# Patient Record
(demographics unavailable — no encounter records)

---

## 2025-05-19 NOTE — ASSESSMENT
[FreeTextEntry1] : will start singulair and prn albuterol  check labs, ddimer, cbc, bmp  if ddimer elevated will order cta, if negative will order noncontrast chest ct, body habitus makes it difficult to fully evaluate lungs on cxr.    A total of 45 minutes was spent on this encounter including history taking, chart review, physical examination, testing interpretation and treatment plan.

## 2025-05-19 NOTE — PROCEDURE
[FreeTextEntry1] : PFT: Normal spirometry.  Lung volumes normal. DLCO mild reduction.  CXR: clear lungs, no focal consolidation or pleural effusions, cardiac silhouette appears normal.  No bony abnormality.

## 2025-05-19 NOTE — HISTORY OF PRESENT ILLNESS
[Never] : never [TextBox_4] : 58F   reports pneumonia in march, since then more sob and wheezing than normal.  No hx of asthma, suffers from allergies.  Sedentary job, at her desk for many hours a day, works from home, this hasn't changed.  No recent travel.  No cough/hemoptysis.

## 2025-05-30 NOTE — PROCEDURE
[FreeTextEntry1] : Reviewed:   	 EXAM: [id number] - US DPLX LWR EXT VEINS COMPL BI  - ORDERED BY: MAKAYLA SYLVESTER   PROCEDURE DATE:  05/27/2025    INTERPRETATION:  CLINICAL INFORMATION: Evaluate for DVT.  COMPARISON: None available.  TECHNIQUE: Duplex sonography of the BILATERAL LOWER extremity veins with color and spectral Doppler, with and without compression.  FINDINGS:  RIGHT: Normal compressibility of the RIGHT common femoral, femoral and popliteal veins. Doppler examination shows normal spontaneous and phasic flow. No RIGHT calf vein thrombosis is detected.  LEFT: Normal compressibility of the LEFT common femoral, femoral and popliteal veins. Doppler examination shows normal spontaneous and phasic flow. No LEFT calf vein thrombosis is detected.  IMPRESSION: No evidence of deep venous thrombosis in either lower extremity.  --- End of Report ---       IVETTE JAMES This document has been electronically signed. May 27 2025  5:05PM   	 EXAM: [id number] - CT ANGIO CHEST PULM ART [facility]   - ORDERED BY: MAKAYLA SYLVESTER   PROCEDURE DATE:  05/20/2025    INTERPRETATION:  INDICATION: Shortness of breath, elevated d-dimer, evaluate for pulmonary embolism.  CT angiogram of the chest was performed following intravenous administration of 70 cc of Omnipaque-350. 20 cc of contrast was discarded. MIP images are submitted.  COMPARISON: No prior chest CTs.  The PE study is limited for evaluation of some of the subsegmental pulmonary arterial branches due to respiratory motion artifact. No pulmonary arterial emboli within the other well visualized pulmonary arteries.  Heterogeneous enlargement of the thyroid gland with intrathoracic extension of the left lobe. For reference the left lobe of the thyroid gland measures about 3.8 cm in transverse dimension.  LYMPH NODES/MEDIASTINUM: No lymphadenopathy.  HEART: No pericardial effusion. Mild mitral annular calcification. Left atrium measures enlarged measuring about 5.1 cm in anteroposterior dimension. Main pulmonary artery measures enlarged measuring about 3.9 cm in transverse dimension.  UPPER ABDOMEN: Small hiatal hernia.  LUNGS/PLEURA: Respiratory motion artifact slightly limits evaluation of the lung parenchyma. No pneumonia. Minimal bilateral lower lung linear or subsegmental atelectasis. No traction bronchiectasis or honeycombing.  CHEST WALL: Spinal Degenerative Changes.  IMPRESSION: The PE study is limited for evaluation of some of the subsegmental pulmonary arterial branches due to respiratory motion artifact. No pulmonary arterial emboli within the other well visualized pulmonary arteries.  Enlargement of the thyroid gland.  --- End of Report ---       DANIEL CARVAJAL MD; Attending Radiologist This document has been electronically signed. May 20 2025  3:18PM  PFT: Normal spirometry.  Lung volumes normal. DLCO mild reduction.  CXR: clear lungs, no focal consolidation or pleural effusions, cardiac silhouette appears normal.  No bony abnormality.

## 2025-05-30 NOTE — HISTORY OF PRESENT ILLNESS
[TextBox_4] : feels better with singulair less wheezing used albuterol once  still has dyspnea morbid obesity snoring never tested for kiki

## 2025-05-30 NOTE — ASSESSMENT
[FreeTextEntry1] : cont singulair  discussed weight as a likely contributor to her dyspnea high suspicion for kiki will get home sleep test done briefly discussed the use of zepbound to aid weight loss if KIKI confirmed, she is agreeable.  recheck tobi

## 2025-07-14 NOTE — HISTORY OF PRESENT ILLNESS
[Never] : never [TextBox_4] : completed home sleeping test requires refill on Montelukast  still has dyspnea morbid obesity snoring

## 2025-07-14 NOTE — ASSESSMENT
[FreeTextEntry1] : reviewed home sleeping test - showed moderate ANUPAM and oxygen desaturation dipping into the 70s  complete in lab titration study  renewed Montelukast  fu after in lab sleeping test